# Patient Record
Sex: FEMALE | Race: WHITE | NOT HISPANIC OR LATINO | Employment: FULL TIME | URBAN - NONMETROPOLITAN AREA
[De-identification: names, ages, dates, MRNs, and addresses within clinical notes are randomized per-mention and may not be internally consistent; named-entity substitution may affect disease eponyms.]

---

## 2023-05-02 ENCOUNTER — OFFICE VISIT (OUTPATIENT)
Dept: PRIMARY CARE | Facility: CLINIC | Age: 48
End: 2023-05-02
Payer: COMMERCIAL

## 2023-05-02 VITALS
WEIGHT: 125.7 LBS | HEIGHT: 65 IN | SYSTOLIC BLOOD PRESSURE: 124 MMHG | BODY MASS INDEX: 20.94 KG/M2 | HEART RATE: 98 BPM | DIASTOLIC BLOOD PRESSURE: 70 MMHG

## 2023-05-02 DIAGNOSIS — R10.11 RIGHT UPPER QUADRANT ABDOMINAL PAIN: Primary | ICD-10-CM

## 2023-05-02 DIAGNOSIS — R11.0 CHRONIC NAUSEA: ICD-10-CM

## 2023-05-02 DIAGNOSIS — Z09 HOSPITAL DISCHARGE FOLLOW-UP: ICD-10-CM

## 2023-05-02 PROCEDURE — 99214 OFFICE O/P EST MOD 30 MIN: CPT | Performed by: NURSE PRACTITIONER

## 2023-05-02 RX ORDER — ESTRADIOL AND NORETHINDRONE ACETATE 1; .5 MG/1; MG/1
1 TABLET ORAL
COMMUNITY
Start: 2020-12-04

## 2023-05-02 RX ORDER — UMECLIDINIUM BROMIDE AND VILANTEROL TRIFENATATE 62.5; 25 UG/1; UG/1
1 POWDER RESPIRATORY (INHALATION)
COMMUNITY
Start: 2020-12-11 | End: 2023-07-07 | Stop reason: SDUPTHER

## 2023-05-02 RX ORDER — DICYCLOMINE HYDROCHLORIDE 20 MG/1
20 TABLET ORAL
COMMUNITY
Start: 2022-05-06

## 2023-05-02 RX ORDER — PANTOPRAZOLE SODIUM 40 MG/1
40 TABLET, DELAYED RELEASE ORAL DAILY
COMMUNITY
End: 2023-07-07 | Stop reason: SDUPTHER

## 2023-05-02 RX ORDER — BUPROPION HYDROCHLORIDE 150 MG/1
150 TABLET, EXTENDED RELEASE ORAL
COMMUNITY
Start: 2020-12-11 | End: 2023-07-07 | Stop reason: SDUPTHER

## 2023-05-02 RX ORDER — ALBUTEROL SULFATE 90 UG/1
AEROSOL, METERED RESPIRATORY (INHALATION)
COMMUNITY
End: 2023-07-07 | Stop reason: SDUPTHER

## 2023-05-02 RX ORDER — LEVOTHYROXINE SODIUM 25 UG/1
25 TABLET ORAL
COMMUNITY
Start: 2020-12-11 | End: 2023-07-07 | Stop reason: SDUPTHER

## 2023-05-02 ASSESSMENT — ENCOUNTER SYMPTOMS
FEVER: 0
BLOOD IN STOOL: 0
DIARRHEA: 0
MUSCULOSKELETAL NEGATIVE: 1
LIGHT-HEADEDNESS: 0
DIZZINESS: 0
FATIGUE: 0
HEADACHES: 0
DIFFICULTY URINATING: 0
ABDOMINAL PAIN: 1
ACTIVITY CHANGE: 0
CONSTIPATION: 0
NAUSEA: 1
APPETITE CHANGE: 1
RECTAL PAIN: 0
HEMATURIA: 0
VOMITING: 1

## 2023-05-02 NOTE — PROGRESS NOTES
"Subjective   Patient ID: Mari Sharma is a 48 y.o. female who presents for ER Follow-up (Was in on 4/27 and was released on 4/28).    Hospital discharge follow up:  States prior to going to ER she was having abd pain and vomiting, she then went to the ER at [facility],   She reports vomiting several times in 24 hour prior to ER  Denies diarrhea  States abd pain occurred RUQ, with vomiting (this is always the case)  Hospital admitted for SADIA and dehydration, renal function has returned to normal  Patient reports ABD pain and vomiting episodes every 2-3 months.  Is now tracking the episodes detailed food intake  CT A/P did show subcentimeter non-obstructing renal stones  Patient states she continues to have some nausea and ABD pain, mild compared to pre hospital   She has colonoscopy recently which was negative  No prior EGD         Review of Systems   Constitutional:  Positive for appetite change. Negative for activity change, fatigue and fever.   Gastrointestinal:  Positive for abdominal pain, nausea and vomiting. Negative for blood in stool, constipation, diarrhea and rectal pain.   Genitourinary:  Negative for difficulty urinating, hematuria and pelvic pain.   Musculoskeletal: Negative.    Skin: Negative.    Neurological:  Negative for dizziness, light-headedness and headaches.       Objective   /70 (Patient Position: Sitting)   Pulse 98   Ht 1.651 m (5' 5\")   Wt 57 kg (125 lb 11.2 oz)   BMI 20.92 kg/m²     Physical Exam  Constitutional:       General: She is not in acute distress.     Appearance: Normal appearance. She is normal weight. She is ill-appearing (chronically ill appearing).   Cardiovascular:      Rate and Rhythm: Normal rate and regular rhythm.      Pulses: Normal pulses.   Pulmonary:      Effort: Pulmonary effort is normal.      Breath sounds: Normal breath sounds.   Abdominal:      General: Abdomen is flat. Bowel sounds are normal. There is no distension.      Palpations: There is no mass. "      Tenderness: There is no abdominal tenderness.   Skin:     General: Skin is warm and dry.      Coloration: Skin is pale.   Neurological:      Mental Status: She is alert and oriented to person, place, and time.         Assessment/Plan   Diagnoses and all orders for this visit:  Right upper quadrant abdominal pain  -     Referral to Gastroenterology; Future  -     Advised to continue keeping abdominal pain journal  -    Recommend she follow a bland diet, avoid any spicy foods or red sauces.   Chronic nausea  -     Referral to Gastroenterology; Future  -     Continue to take Zofran previously prescribed as needed, may also try OTC Counter ginger to help with nausea.   Hospital discharge follow-up

## 2023-06-23 ENCOUNTER — HOSPITAL ENCOUNTER (OUTPATIENT)
Dept: DATA CONVERSION | Facility: HOSPITAL | Age: 48
End: 2023-06-23
Attending: INTERNAL MEDICINE | Admitting: INTERNAL MEDICINE
Payer: COMMERCIAL

## 2023-06-23 DIAGNOSIS — Z72.0 TOBACCO USE: ICD-10-CM

## 2023-06-23 DIAGNOSIS — R12 HEARTBURN: ICD-10-CM

## 2023-06-23 DIAGNOSIS — K30 FUNCTIONAL DYSPEPSIA: ICD-10-CM

## 2023-06-23 DIAGNOSIS — E07.9 DISORDER OF THYROID, UNSPECIFIED: ICD-10-CM

## 2023-06-23 DIAGNOSIS — J44.9 CHRONIC OBSTRUCTIVE PULMONARY DISEASE, UNSPECIFIED (MULTI): ICD-10-CM

## 2023-06-23 DIAGNOSIS — E78.00 PURE HYPERCHOLESTEROLEMIA, UNSPECIFIED: ICD-10-CM

## 2023-06-23 DIAGNOSIS — K20.90 ESOPHAGITIS, UNSPECIFIED WITHOUT BLEEDING: ICD-10-CM

## 2023-06-23 DIAGNOSIS — R10.11 RIGHT UPPER QUADRANT PAIN: ICD-10-CM

## 2023-06-23 DIAGNOSIS — R11.0 NAUSEA: ICD-10-CM

## 2023-06-29 LAB
COMPLETE PATHOLOGY REPORT: NORMAL
CONVERTED CLINICAL DIAGNOSIS-HISTORY: NORMAL
CONVERTED FINAL DIAGNOSIS: NORMAL
CONVERTED FINAL REPORT PDF LINK TO COPY AND PASTE: NORMAL
CONVERTED GROSS DESCRIPTION: NORMAL

## 2023-07-07 ENCOUNTER — LAB (OUTPATIENT)
Dept: LAB | Facility: LAB | Age: 48
End: 2023-07-07
Payer: COMMERCIAL

## 2023-07-07 ENCOUNTER — OFFICE VISIT (OUTPATIENT)
Dept: PRIMARY CARE | Facility: CLINIC | Age: 48
End: 2023-07-07
Payer: COMMERCIAL

## 2023-07-07 VITALS
HEART RATE: 94 BPM | DIASTOLIC BLOOD PRESSURE: 70 MMHG | HEIGHT: 65 IN | WEIGHT: 119.2 LBS | SYSTOLIC BLOOD PRESSURE: 118 MMHG | BODY MASS INDEX: 19.86 KG/M2

## 2023-07-07 DIAGNOSIS — J44.9 COPD, MODERATE (MULTI): ICD-10-CM

## 2023-07-07 DIAGNOSIS — R80.9 PROTEINURIA, UNSPECIFIED TYPE: ICD-10-CM

## 2023-07-07 DIAGNOSIS — K29.20 CHRONIC ALCOHOLIC GASTRITIS WITHOUT HEMORRHAGE: Primary | ICD-10-CM

## 2023-07-07 DIAGNOSIS — E87.6 HYPOKALEMIA: ICD-10-CM

## 2023-07-07 DIAGNOSIS — E03.9 ACQUIRED HYPOTHYROIDISM: ICD-10-CM

## 2023-07-07 DIAGNOSIS — F32.89 OTHER DEPRESSION: ICD-10-CM

## 2023-07-07 DIAGNOSIS — J20.9 ACUTE BRONCHITIS, UNSPECIFIED ORGANISM: ICD-10-CM

## 2023-07-07 DIAGNOSIS — K21.9 GASTROESOPHAGEAL REFLUX DISEASE WITHOUT ESOPHAGITIS: ICD-10-CM

## 2023-07-07 PROBLEM — R61 NIGHT SWEATS: Status: ACTIVE | Noted: 2023-07-07

## 2023-07-07 PROBLEM — F32.A DEPRESSION: Status: ACTIVE | Noted: 2023-07-07

## 2023-07-07 LAB
ALANINE AMINOTRANSFERASE (SGPT) (U/L) IN SER/PLAS: 12 U/L (ref 7–45)
ALBUMIN (G/DL) IN SER/PLAS: 4.4 G/DL (ref 3.4–5)
ALKALINE PHOSPHATASE (U/L) IN SER/PLAS: 46 U/L (ref 33–110)
ANION GAP IN SER/PLAS: 11 MMOL/L (ref 10–20)
APPEARANCE, URINE: NORMAL
ASPARTATE AMINOTRANSFERASE (SGOT) (U/L) IN SER/PLAS: 15 U/L (ref 9–39)
BILIRUBIN TOTAL (MG/DL) IN SER/PLAS: 0.5 MG/DL (ref 0–1.2)
BILIRUBIN, URINE: NEGATIVE
BLOOD, URINE: NEGATIVE
CALCIUM (MG/DL) IN SER/PLAS: 9.5 MG/DL (ref 8.6–10.3)
CARBON DIOXIDE, TOTAL (MMOL/L) IN SER/PLAS: 29 MMOL/L (ref 21–32)
CHLORIDE (MMOL/L) IN SER/PLAS: 104 MMOL/L (ref 98–107)
COLOR, URINE: YELLOW
CREATININE (MG/DL) IN SER/PLAS: 0.78 MG/DL (ref 0.5–1.05)
GFR FEMALE: >90 ML/MIN/1.73M2
GLUCOSE (MG/DL) IN SER/PLAS: 82 MG/DL (ref 74–99)
GLUCOSE, URINE: NEGATIVE MG/DL
KETONES, URINE: NEGATIVE MG/DL
LEUKOCYTE ESTERASE, URINE: NEGATIVE
NITRITE, URINE: NEGATIVE
PH, URINE: 7 (ref 5–8)
POTASSIUM (MMOL/L) IN SER/PLAS: 3.6 MMOL/L (ref 3.5–5.3)
PROTEIN TOTAL: 6.5 G/DL (ref 6.4–8.2)
PROTEIN, URINE: NEGATIVE MG/DL
SODIUM (MMOL/L) IN SER/PLAS: 140 MMOL/L (ref 136–145)
SPECIFIC GRAVITY, URINE: 1.01 (ref 1–1.03)
UREA NITROGEN (MG/DL) IN SER/PLAS: 8 MG/DL (ref 6–23)
UROBILINOGEN, URINE: <2 MG/DL (ref 0–1.9)

## 2023-07-07 PROCEDURE — 99214 OFFICE O/P EST MOD 30 MIN: CPT | Performed by: NURSE PRACTITIONER

## 2023-07-07 PROCEDURE — 81003 URINALYSIS AUTO W/O SCOPE: CPT

## 2023-07-07 PROCEDURE — 36415 COLL VENOUS BLD VENIPUNCTURE: CPT

## 2023-07-07 PROCEDURE — 80053 COMPREHEN METABOLIC PANEL: CPT

## 2023-07-07 RX ORDER — METHYLPREDNISOLONE 4 MG/1
TABLET ORAL
Qty: 21 TABLET | Refills: 0 | Status: SHIPPED | OUTPATIENT
Start: 2023-07-07 | End: 2023-07-14

## 2023-07-07 RX ORDER — BUPROPION HYDROCHLORIDE 150 MG/1
150 TABLET, EXTENDED RELEASE ORAL
Qty: 30 TABLET | Refills: 11 | Status: SHIPPED | OUTPATIENT
Start: 2023-07-07 | End: 2024-07-06

## 2023-07-07 RX ORDER — SUCRALFATE 1 G/1
1 TABLET ORAL
COMMUNITY
Start: 2023-07-04 | End: 2023-08-03 | Stop reason: SDUPTHER

## 2023-07-07 RX ORDER — UMECLIDINIUM BROMIDE AND VILANTEROL TRIFENATATE 62.5; 25 UG/1; UG/1
1 POWDER RESPIRATORY (INHALATION)
Qty: 30 EACH | Refills: 11 | Status: SHIPPED | OUTPATIENT
Start: 2023-07-07 | End: 2024-07-06

## 2023-07-07 RX ORDER — ALBUTEROL SULFATE 90 UG/1
2 AEROSOL, METERED RESPIRATORY (INHALATION) EVERY 6 HOURS PRN
Qty: 18 G | Refills: 2 | Status: SHIPPED | OUTPATIENT
Start: 2023-07-07 | End: 2024-02-12

## 2023-07-07 RX ORDER — PANTOPRAZOLE SODIUM 40 MG/1
40 TABLET, DELAYED RELEASE ORAL DAILY
Qty: 30 TABLET | Refills: 11 | Status: SHIPPED | OUTPATIENT
Start: 2023-07-07 | End: 2023-10-16 | Stop reason: SDUPTHER

## 2023-07-07 RX ORDER — AZITHROMYCIN 250 MG/1
TABLET, FILM COATED ORAL
Qty: 6 TABLET | Refills: 0 | Status: SHIPPED | OUTPATIENT
Start: 2023-07-07 | End: 2023-07-12

## 2023-07-07 RX ORDER — LEVOTHYROXINE SODIUM 25 UG/1
25 TABLET ORAL
Qty: 30 TABLET | Refills: 11 | Status: SHIPPED | OUTPATIENT
Start: 2023-07-07 | End: 2024-07-06

## 2023-07-07 NOTE — PROGRESS NOTES
"Subjective   Patient ID: Mari Sharma is a 48 y.o. female who presents for Hypothyroidism (6 month).    Daily alcohol consumption 3-4 drinks (beer), daily   Daily smoker  Recent ER visit CT A/P showing recurrent gastritis  CT results as follow:  1. There is persistent abnormal thickened appearance of the gastric rugal fold   pattern in part related to partial distention as well as significant gastritis   again noted.     2. Multiple retained gallstones abutting the hepatic capsular surfaces. Most   are located within Morison pouch. Prior cholecystectomy.     3. Stable mild intrahepatic and moderate extrahepatic biliary ductal dilatation   as well as mild ectasia of the main pancreatic duct. Negative for acute   pancreatitis.     4. Subcentimeter nonobstructive renal calculi again noted without   hydronephrosis.     5. Bowel pattern is nonobstructive. Prior appendectomy. Mild sigmoid   diverticulosis without diverticulitis.     6. Enlarged leiomyomatous uterus again noted.     Electrolytes were abnormal including low potassium 3.3  UA was positive for protein and ketones  Patient continues to have intermittent symptoms of gastritis, reports compliance with taking sucralfate and Protonix  ABD pain \"comes/goes\"  Has not been able to return to work since ER visit, states she is now having cough and upper respiratory sx, continues to be a daily smoker  Reports compliance with inhalers           Review of Systems   Constitutional:  Negative for appetite change and fever.   HENT:  Positive for congestion.    Respiratory:  Positive for cough and wheezing. Negative for chest tightness and shortness of breath.    Cardiovascular:  Negative for chest pain, palpitations and leg swelling.   Gastrointestinal:  Positive for abdominal pain, nausea and vomiting. Negative for diarrhea.   Endocrine: Negative for cold intolerance and heat intolerance.   Musculoskeletal:  Negative for arthralgias and myalgias.   Skin: Negative.  " "  Neurological:  Negative for dizziness, weakness, light-headedness and headaches.       Objective   /70 (Patient Position: Sitting)   Pulse 94   Ht 1.651 m (5' 5\")   Wt 54.1 kg (119 lb 3.2 oz)   BMI 19.84 kg/m²     Physical Exam  Constitutional:       General: She is not in acute distress.     Appearance: Normal appearance.   Cardiovascular:      Rate and Rhythm: Normal rate and regular rhythm.      Pulses: Normal pulses.   Pulmonary:      Breath sounds: Wheezing present.      Comments: Coarse lung sounds posteriorly   Abdominal:      General: Abdomen is flat. Bowel sounds are normal.      Palpations: Abdomen is soft.      Tenderness: There is no abdominal tenderness.   Musculoskeletal:      Right lower leg: No edema.      Left lower leg: No edema.   Skin:     General: Skin is warm.      Coloration: Skin is pale.   Neurological:      Mental Status: She is alert and oriented to person, place, and time.         Assessment/Plan   Diagnoses and all orders for this visit:  Chronic alcoholic gastritis without hemorrhage   -Continue on sucralfate and Protonix as ordered   -Advised to decrease alcohol consumption  Hypokalemia  -     Comprehensive Metabolic Panel; Future  Proteinuria, unspecified type  -     Comprehensive Metabolic Panel; Future  -     Urinalysis with Reflex Microscopic; Future  Acute bronchitis, unspecified organism  -     methylPREDNISolone (Medrol Dospak) 4 mg tablets; Take as directed on package.  -     azithromycin (Zithromax) 250 mg tablet; Take 2 tabs (500 mg) by mouth today, then 1 daily for 4 more days.  -     albuterol 90 mcg/actuation inhaler; Inhale 2 puffs every 6 hours if needed for wheezing.  Acquired hypothyroidism  -     levothyroxine (Synthroid, Levoxyl) 25 mcg tablet; Take 1 tablet (25 mcg) by mouth once daily. Refill sent  COPD, moderate (CMS/HCC)  -     albuterol 90 mcg/actuation inhaler; Inhale 2 puffs every 6 hours if needed for wheezing.  -     Anoro Ellipta 62.5-25 " mcg/actuation blister with device; Inhale 1 puff once daily.  Gastroesophageal reflux disease without esophagitis  -     pantoprazole (ProtoNix) 40 mg EC tablet; Take 1 tablet (40 mg) by mouth once daily.  Other depression  -     buPROPion SR (Wellbutrin SR) 150 mg 12 hr tablet; Take 1 tablet (150 mg) by mouth once daily.  Other orders  -     Follow Up In Primary Care - Health Maintenance; Future

## 2023-07-07 NOTE — LETTER
July 7, 2023     Patient: Mari Sharma   YOB: 1975   Date of Visit: 7/7/2023       To Whom It May Concern:    Mari Sharma was seen in my clinic on 7/7/2023 at 2:00 pm. Please excuse Mari for her absence from work 07/06/2023 through 07/09/2023    If you have any questions or concerns, please don't hesitate to call.         Sincerely,         Ivring Gaming, APRN-CNP        CC: No Recipients

## 2023-07-09 ASSESSMENT — ENCOUNTER SYMPTOMS
VOMITING: 1
ABDOMINAL PAIN: 1
WHEEZING: 1
WEAKNESS: 0
DIARRHEA: 0
LIGHT-HEADEDNESS: 0
COUGH: 1
ARTHRALGIAS: 0
HEADACHES: 0
NAUSEA: 1
CHEST TIGHTNESS: 0
PALPITATIONS: 0
FEVER: 0
APPETITE CHANGE: 0
DIZZINESS: 0
SHORTNESS OF BREATH: 0
MYALGIAS: 0

## 2023-08-03 ENCOUNTER — TELEPHONE (OUTPATIENT)
Dept: PRIMARY CARE | Facility: CLINIC | Age: 48
End: 2023-08-03
Payer: COMMERCIAL

## 2023-08-03 DIAGNOSIS — K21.9 GASTROESOPHAGEAL REFLUX DISEASE WITHOUT ESOPHAGITIS: ICD-10-CM

## 2023-08-03 RX ORDER — SUCRALFATE 1 G/1
1 TABLET ORAL
Qty: 60 TABLET | Refills: 0 | Status: SHIPPED | OUTPATIENT
Start: 2023-08-03 | End: 2023-08-09 | Stop reason: SDUPTHER

## 2023-08-09 ENCOUNTER — OFFICE VISIT (OUTPATIENT)
Dept: PRIMARY CARE | Facility: CLINIC | Age: 48
End: 2023-08-09
Payer: COMMERCIAL

## 2023-08-09 VITALS
BODY MASS INDEX: 20.04 KG/M2 | DIASTOLIC BLOOD PRESSURE: 80 MMHG | WEIGHT: 120.3 LBS | SYSTOLIC BLOOD PRESSURE: 122 MMHG | HEIGHT: 65 IN | HEART RATE: 104 BPM

## 2023-08-09 DIAGNOSIS — J44.9 COPD, MODERATE (MULTI): Primary | ICD-10-CM

## 2023-08-09 DIAGNOSIS — K29.20 CHRONIC ALCOHOLIC GASTRITIS WITHOUT HEMORRHAGE: ICD-10-CM

## 2023-08-09 DIAGNOSIS — K21.9 GASTROESOPHAGEAL REFLUX DISEASE WITHOUT ESOPHAGITIS: ICD-10-CM

## 2023-08-09 DIAGNOSIS — F10.288 ALCOHOL DEPENDENCE WITH OTHER ALCOHOL-INDUCED DISORDER (MULTI): ICD-10-CM

## 2023-08-09 PROBLEM — F10.20 ALCOHOL DEPENDENCE (MULTI): Status: ACTIVE | Noted: 2023-08-09

## 2023-08-09 PROCEDURE — 99213 OFFICE O/P EST LOW 20 MIN: CPT | Performed by: NURSE PRACTITIONER

## 2023-08-09 RX ORDER — SUCRALFATE 1 G/1
1 TABLET ORAL
Qty: 60 TABLET | Refills: 0 | Status: SHIPPED | OUTPATIENT
Start: 2023-09-03 | End: 2023-10-03

## 2023-08-09 ASSESSMENT — ENCOUNTER SYMPTOMS
FATIGUE: 0
LIGHT-HEADEDNESS: 0
ABDOMINAL PAIN: 0
ARTHRALGIAS: 0
DIARRHEA: 0
WHEEZING: 0
PALPITATIONS: 0
VOMITING: 0
ACTIVITY CHANGE: 0
APPETITE CHANGE: 0
HEADACHES: 0
NAUSEA: 0
DIZZINESS: 0
FEVER: 0
MYALGIAS: 0
COUGH: 1
SHORTNESS OF BREATH: 0

## 2023-08-09 NOTE — PROGRESS NOTES
"Subjective   Patient ID: Mari Sharma is a 48 y.o. female who presents for Gastritis (1 month).    ABD pain has decreased,   Denies N/V/D  Needs refill on Carafate,   Scheduled to see Vivienne in 08/17/23, may need reschedule due to vacation  Alcohol consumption was 5-6 daily has decreased to 4 drinks daily          Review of Systems   Constitutional:  Negative for activity change, appetite change, fatigue and fever.   Respiratory:  Positive for cough. Negative for shortness of breath and wheezing.    Cardiovascular:  Negative for chest pain, palpitations and leg swelling.   Gastrointestinal:  Negative for abdominal pain, diarrhea, nausea and vomiting.   Musculoskeletal:  Negative for arthralgias and myalgias.   Neurological:  Negative for dizziness, light-headedness and headaches.       Objective   /80 (Patient Position: Sitting)   Pulse 104   Ht 1.651 m (5' 5\")   Wt 54.6 kg (120 lb 4.8 oz)   BMI 20.02 kg/m²     Physical Exam  Vitals reviewed.   Constitutional:       General: She is not in acute distress.     Appearance: Normal appearance. She is normal weight.   Cardiovascular:      Rate and Rhythm: Normal rate and regular rhythm.      Pulses: Normal pulses.   Pulmonary:      Effort: Pulmonary effort is normal.      Breath sounds: Normal breath sounds.   Abdominal:      General: Abdomen is flat.      Palpations: Abdomen is soft.      Tenderness: There is no abdominal tenderness.   Skin:     General: Skin is warm and dry.   Neurological:      Mental Status: She is alert and oriented to person, place, and time.         Assessment/Plan   Diagnoses and all orders for this visit:  COPD, moderate (CMS/HCC)   -Patient not interested in quitting smoking   -Continue on Anoro Elipta and albuterol as needed   -Advised to take OTC Mucinex daily for respiratory congestion/cough an increase water consumption   Gastroesophageal reflux disease without esophagitis  -     sucralfate (Carafate) 1 gram tablet; Take 1 " tablet (1 g) by mouth 2 times a day before meals. Do not start before September 3, 2023.  Alcohol dependence with other alcohol-induced disorder (CMS/HCC)   -Advised to decrease alcohol consumption  Had a wyatt conversation regarding alcohol use its correlation to gastritis.   Other orders  -     Follow Up In Primary Care - Health Maintenance  -     Follow Up In Primary Care - Health Maintenance; Future

## 2023-08-14 ENCOUNTER — APPOINTMENT (OUTPATIENT)
Dept: PRIMARY CARE | Facility: CLINIC | Age: 48
End: 2023-08-14
Payer: COMMERCIAL

## 2023-09-07 VITALS — HEIGHT: 65 IN | WEIGHT: 120.15 LBS | BODY MASS INDEX: 20.02 KG/M2

## 2023-09-13 PROBLEM — R82.90 ABNORMAL URINALYSIS: Status: ACTIVE | Noted: 2023-09-13

## 2023-09-13 PROBLEM — J47.9 BRONCHIECTASIS (MULTI): Status: ACTIVE | Noted: 2023-09-13

## 2023-09-13 PROBLEM — R06.83 SNORING: Status: ACTIVE | Noted: 2023-09-13

## 2023-09-13 PROBLEM — R79.89 ABNORMAL CBC: Status: ACTIVE | Noted: 2023-09-13

## 2023-09-13 PROBLEM — R00.2 PALPITATIONS: Status: ACTIVE | Noted: 2023-09-13

## 2023-09-13 PROBLEM — R10.9 ABDOMINAL CRAMPING: Status: ACTIVE | Noted: 2023-09-13

## 2023-09-13 PROBLEM — K92.1 BLOOD IN STOOL: Status: ACTIVE | Noted: 2023-09-13

## 2023-09-13 PROBLEM — R10.9 ABDOMINAL PAIN: Status: ACTIVE | Noted: 2023-09-13

## 2023-09-13 PROBLEM — M77.8 TENDONITIS OF WRIST, LEFT: Status: ACTIVE | Noted: 2023-09-13

## 2023-09-13 PROBLEM — K29.60 GASTRITIS, BILE ACID REFLUX: Status: ACTIVE | Noted: 2023-09-13

## 2023-09-13 RX ORDER — NAPROXEN 500 MG/1
500 TABLET ORAL EVERY 12 HOURS PRN
COMMUNITY

## 2023-10-16 ENCOUNTER — OFFICE VISIT (OUTPATIENT)
Dept: GASTROENTEROLOGY | Facility: CLINIC | Age: 48
End: 2023-10-16
Payer: COMMERCIAL

## 2023-10-16 VITALS — BODY MASS INDEX: 19.69 KG/M2 | WEIGHT: 118.2 LBS | HEIGHT: 65 IN

## 2023-10-16 DIAGNOSIS — K21.9 GASTROESOPHAGEAL REFLUX DISEASE WITHOUT ESOPHAGITIS: ICD-10-CM

## 2023-10-16 DIAGNOSIS — K29.60 GASTRITIS, BILE ACID REFLUX: Primary | ICD-10-CM

## 2023-10-16 PROCEDURE — 99214 OFFICE O/P EST MOD 30 MIN: CPT | Performed by: INTERNAL MEDICINE

## 2023-10-16 RX ORDER — PANTOPRAZOLE SODIUM 40 MG/1
40 TABLET, DELAYED RELEASE ORAL DAILY
Qty: 90 TABLET | Refills: 3 | Status: SHIPPED | OUTPATIENT
Start: 2023-10-16 | End: 2024-10-15

## 2023-10-16 RX ORDER — PROMETHAZINE HYDROCHLORIDE 25 MG/1
25 SUPPOSITORY RECTAL EVERY 6 HOURS PRN
Qty: 30 SUPPOSITORY | Refills: 0 | Status: SHIPPED | OUTPATIENT
Start: 2023-10-16 | End: 2023-11-15

## 2023-10-16 ASSESSMENT — ENCOUNTER SYMPTOMS
ARTHRALGIAS: 0
UNEXPECTED WEIGHT CHANGE: 0
ROS GI COMMENTS: AS IN HPI
SHORTNESS OF BREATH: 0
FEVER: 0
COUGH: 0
CHILLS: 0
PALPITATIONS: 0

## 2023-10-16 ASSESSMENT — LIFESTYLE VARIABLES
HOW OFTEN DURING THE LAST YEAR HAVE YOU NEEDED AN ALCOHOLIC DRINK FIRST THING IN THE MORNING TO GET YOURSELF GOING AFTER A NIGHT OF HEAVY DRINKING: NEVER
AUDIT-C TOTAL SCORE: 8
HOW OFTEN DURING THE LAST YEAR HAVE YOU FOUND THAT YOU WERE NOT ABLE TO STOP DRINKING ONCE YOU HAD STARTED: NEVER
HOW MANY STANDARD DRINKS CONTAINING ALCOHOL DO YOU HAVE ON A TYPICAL DAY: 3 OR 4
HOW OFTEN DO YOU HAVE SIX OR MORE DRINKS ON ONE OCCASION: WEEKLY
HOW OFTEN DURING THE LAST YEAR HAVE YOU HAD A FEELING OF GUILT OR REMORSE AFTER DRINKING: NEVER
HOW OFTEN DO YOU HAVE A DRINK CONTAINING ALCOHOL: 4 OR MORE TIMES A WEEK
AUDIT TOTAL SCORE: 8
SKIP TO QUESTIONS 9-10: 0
HOW OFTEN DURING THE LAST YEAR HAVE YOU FAILED TO DO WHAT WAS NORMALLY EXPECTED FROM YOU BECAUSE OF DRINKING: NEVER
HAS A RELATIVE, FRIEND, DOCTOR, OR ANOTHER HEALTH PROFESSIONAL EXPRESSED CONCERN ABOUT YOUR DRINKING OR SUGGESTED YOU CUT DOWN: NO
HAVE YOU OR SOMEONE ELSE BEEN INJURED AS A RESULT OF YOUR DRINKING: NO
HOW OFTEN DURING THE LAST YEAR HAVE YOU BEEN UNABLE TO REMEMBER WHAT HAPPENED THE NIGHT BEFORE BECAUSE YOU HAD BEEN DRINKING: NEVER

## 2023-10-16 NOTE — PROGRESS NOTES
Subjective   Patient ID: Mari Sharma is a 48 y.o. female who presents for Follow-up (4-8 week follow up. Hx of EGD and Colonoscopy. Vomiting for a couple days approx 1-2 times per month. ).  HPI he is seen today in follow-up.  Upper endoscopy was recently performed because of recalcitrant heartburn nausea.  Upper endoscopy revealed alkaline reflux gastritis.  She failed therapeutic trial of sucralfate.  She is currently on Protonix 40 mg daily.  Approximately once or twice a month she will wake up in the middle the night or in the morning with severe nausea producing projectile vomiting of bile and cannot make it to work that day.  She requests higher dosage of Zofran as it is an effective or better medication.    Review of Systems   Constitutional:  Negative for chills, fever and unexpected weight change.   Respiratory:  Negative for cough and shortness of breath.    Cardiovascular:  Negative for chest pain, palpitations and leg swelling.   Gastrointestinal:         As in HPI   Musculoskeletal:  Negative for arthralgias and gait problem.   Skin:  Negative for rash.       Objective   Physical Exam  Vitals reviewed.   Constitutional:       General: She is awake.      Appearance: Normal appearance.   HENT:      Head: Normocephalic.      Mouth/Throat:      Mouth: Mucous membranes are moist.      Pharynx: Oropharynx is clear.   Eyes:      Conjunctiva/sclera: Conjunctivae normal.      Pupils: Pupils are equal, round, and reactive to light.   Cardiovascular:      Rate and Rhythm: Normal rate and regular rhythm.      Heart sounds: Normal heart sounds.      Comments: No overt chest pain  Pulmonary:      Effort: Pulmonary effort is normal.      Breath sounds: Normal breath sounds.   Abdominal:      General: Bowel sounds are normal.      Palpations: Abdomen is soft.      Tenderness: There is no abdominal tenderness.   Musculoskeletal:      Cervical back: Normal range of motion and neck supple.   Skin:     General: Skin is  warm and dry.   Neurological:      Mental Status: She is alert and oriented to person, place, and time.   Psychiatric:         Attention and Perception: Attention and perception normal.         Behavior: Behavior normal.         Assessment/Plan   Problem List Items Addressed This Visit             ICD-10-CM    GERD (gastroesophageal reflux disease) K21.9    Relevant Medications    promethazine (Phenergan) 25 mg suppository    pantoprazole (ProtoNix) 40 mg EC tablet    Other Relevant Orders    Follow Up In Gastroenterology    Gastritis, bile acid reflux - Primary K29.60    Relevant Medications    promethazine (Phenergan) 25 mg suppository    Other Relevant Orders    Follow Up In Gastroenterology     I advised we need to change timing of Protonix to 40 mg with large meal and evening discontinue Zofran as it is ineffective use Phenergan for severe nausea follow-up in 6 months

## 2024-02-12 ENCOUNTER — OFFICE VISIT (OUTPATIENT)
Dept: PRIMARY CARE | Facility: CLINIC | Age: 49
End: 2024-02-12
Payer: COMMERCIAL

## 2024-02-12 ENCOUNTER — HOSPITAL ENCOUNTER (OUTPATIENT)
Dept: RADIOLOGY | Facility: CLINIC | Age: 49
Discharge: HOME | End: 2024-02-12
Payer: COMMERCIAL

## 2024-02-12 VITALS
OXYGEN SATURATION: 97 % | WEIGHT: 114 LBS | DIASTOLIC BLOOD PRESSURE: 80 MMHG | SYSTOLIC BLOOD PRESSURE: 130 MMHG | BODY MASS INDEX: 18.99 KG/M2 | HEART RATE: 96 BPM | HEIGHT: 65 IN

## 2024-02-12 DIAGNOSIS — R10.9 ABDOMINAL CRAMPING: ICD-10-CM

## 2024-02-12 DIAGNOSIS — G89.29 CHRONIC RIGHT SHOULDER PAIN: ICD-10-CM

## 2024-02-12 DIAGNOSIS — E03.9 HYPOTHYROIDISM, UNSPECIFIED TYPE: ICD-10-CM

## 2024-02-12 DIAGNOSIS — J44.9 COPD, MODERATE (MULTI): ICD-10-CM

## 2024-02-12 DIAGNOSIS — M54.2 NECK PAIN: Primary | ICD-10-CM

## 2024-02-12 DIAGNOSIS — Z13.220 SCREENING FOR CHOLESTEROL LEVEL: ICD-10-CM

## 2024-02-12 DIAGNOSIS — M54.2 NECK PAIN: ICD-10-CM

## 2024-02-12 DIAGNOSIS — F10.288 ALCOHOL DEPENDENCE WITH OTHER ALCOHOL-INDUCED DISORDER (MULTI): ICD-10-CM

## 2024-02-12 DIAGNOSIS — M25.511 CHRONIC RIGHT SHOULDER PAIN: ICD-10-CM

## 2024-02-12 PROCEDURE — 73030 X-RAY EXAM OF SHOULDER: CPT | Mod: RIGHT SIDE | Performed by: RADIOLOGY

## 2024-02-12 PROCEDURE — 73030 X-RAY EXAM OF SHOULDER: CPT | Mod: RT

## 2024-02-12 PROCEDURE — 72040 X-RAY EXAM NECK SPINE 2-3 VW: CPT | Performed by: RADIOLOGY

## 2024-02-12 PROCEDURE — 72040 X-RAY EXAM NECK SPINE 2-3 VW: CPT

## 2024-02-12 PROCEDURE — 99214 OFFICE O/P EST MOD 30 MIN: CPT

## 2024-02-12 RX ORDER — SUCRALFATE 1 G/1
1 TABLET ORAL DAILY
COMMUNITY
End: 2024-02-12 | Stop reason: SDUPTHER

## 2024-02-12 RX ORDER — DICLOFENAC SODIUM 10 MG/G
4 GEL TOPICAL 4 TIMES DAILY
Qty: 100 G | Refills: 1 | Status: SHIPPED | OUTPATIENT
Start: 2024-02-12

## 2024-02-12 RX ORDER — CYCLOBENZAPRINE HCL 10 MG
10 TABLET ORAL NIGHTLY PRN
Qty: 30 TABLET | Refills: 0 | Status: SHIPPED | OUTPATIENT
Start: 2024-02-12 | End: 2024-04-12

## 2024-02-12 RX ORDER — SUCRALFATE 1 G/1
1 TABLET ORAL DAILY
Qty: 30 TABLET | Refills: 2 | Status: SHIPPED | OUTPATIENT
Start: 2024-02-12 | End: 2024-04-18 | Stop reason: SDUPTHER

## 2024-02-12 ASSESSMENT — ENCOUNTER SYMPTOMS
GASTROINTESTINAL NEGATIVE: 1
CONSTITUTIONAL NEGATIVE: 1
RESPIRATORY NEGATIVE: 1
CARDIOVASCULAR NEGATIVE: 1

## 2024-02-12 ASSESSMENT — PATIENT HEALTH QUESTIONNAIRE - PHQ9
SUM OF ALL RESPONSES TO PHQ9 QUESTIONS 1 AND 2: 0
1. LITTLE INTEREST OR PLEASURE IN DOING THINGS: NOT AT ALL
2. FEELING DOWN, DEPRESSED OR HOPELESS: NOT AT ALL

## 2024-02-12 NOTE — PROGRESS NOTES
"Subjective   Patient ID: Mari Sharma is a 49 y.o. female who presents for 6 month med check  (Pt c/o right side shoulder pain, right hand numbness).    HPI   GERD: Issues since gallbladder was removed, has struggled with abdominal pain and vomiting after eating for years now, following with Dr. Jack, appt soon, Protonix and sucralfate current regimen.  HYPOTHYROID: Compliant with levo, no SE. Last TSH WNL.  ANXIETY: Compliant with Wellbutrin, stable. KIMBER 0 today.  COPD: Stable on Anoro, needing rescue about 5-10 times yearly.   ALCOHOL DEPENDENCE: 4-5 drinks daily.  SMOKER: 38 pack years.   SHOULDER/NECK PAIN: R shoulder/neck pain chronically. Worse in colder weather. Work is physical. Pain is intermittent, cannot correlate with movement, seems to be posture related. Has not been to chiropractor. R arm has gone \"numb\" while lying down as well.     [facility] in [place]. They take care of mammos, good 2023.  Colonoscopy 2022, due again 2027.      Review of Systems   Constitutional: Negative.    Respiratory: Negative.     Cardiovascular: Negative.    Gastrointestinal: Negative.        Objective   /80   Pulse 96   Ht 1.651 m (5' 5\")   Wt 51.7 kg (114 lb)   SpO2 97%   BMI 18.97 kg/m²     Physical Exam  Constitutional:       General: She is not in acute distress.     Appearance: Normal appearance. She is not ill-appearing.   HENT:      Head: Normocephalic and atraumatic.   Eyes:      Extraocular Movements: Extraocular movements intact.      Conjunctiva/sclera: Conjunctivae normal.   Cardiovascular:      Rate and Rhythm: Normal rate.   Pulmonary:      Effort: Pulmonary effort is normal.   Abdominal:      General: There is no distension.   Musculoskeletal:         General: Tenderness present. Normal range of motion.      Cervical back: Normal range of motion.   Skin:     General: Skin is warm and dry.   Neurological:      General: No focal deficit present.      Mental Status: She is alert and oriented " to person, place, and time.   Psychiatric:         Mood and Affect: Mood normal.         Behavior: Behavior normal.         Thought Content: Thought content normal.         Judgment: Judgment normal.         Assessment/Plan        Chronic conditions stable.  Xray R shoulder and neck today, Rx Flexeril/Volteran gel, advised chiropractor/massage therapy, let me know if symptoms not resolving.  No other medication changes today.  Fasting labs soon.  Follow up 6 months or sooner prn.

## 2024-04-18 ENCOUNTER — OFFICE VISIT (OUTPATIENT)
Dept: GASTROENTEROLOGY | Facility: CLINIC | Age: 49
End: 2024-04-18
Payer: COMMERCIAL

## 2024-04-18 VITALS — BODY MASS INDEX: 18.97 KG/M2 | WEIGHT: 114 LBS

## 2024-04-18 DIAGNOSIS — R10.9 ABDOMINAL CRAMPING: ICD-10-CM

## 2024-04-18 DIAGNOSIS — K29.60 ALKALINE REFLUX GASTRITIS: Primary | ICD-10-CM

## 2024-04-18 DIAGNOSIS — K21.9 GASTROESOPHAGEAL REFLUX DISEASE WITHOUT ESOPHAGITIS: ICD-10-CM

## 2024-04-18 DIAGNOSIS — K29.60 GASTRITIS, BILE ACID REFLUX: ICD-10-CM

## 2024-04-18 PROCEDURE — 99213 OFFICE O/P EST LOW 20 MIN: CPT | Performed by: INTERNAL MEDICINE

## 2024-04-18 RX ORDER — SUCRALFATE 1 G/1
1 TABLET ORAL
Qty: 180 TABLET | Refills: 3 | Status: SHIPPED | OUTPATIENT
Start: 2024-04-18 | End: 2025-04-18

## 2024-04-18 ASSESSMENT — ENCOUNTER SYMPTOMS
ABDOMINAL PAIN: 1
ENDOCRINE NEGATIVE: 1
RESPIRATORY NEGATIVE: 1
CONSTITUTIONAL NEGATIVE: 1
NEUROLOGICAL NEGATIVE: 1
HEMATOLOGIC/LYMPHATIC NEGATIVE: 1
CARDIOVASCULAR NEGATIVE: 1
EYES NEGATIVE: 1
NAUSEA: 1

## 2024-04-18 NOTE — PROGRESS NOTES
Subjective   Patient ID: Mari Sharma is a 49 y.o. female who presents for Nausea (2 instances in December, has to go to ER every time. Pt reports it has been better recently.  ).  HPI patient is seen today acutely.  Is having increasing dyspepsia underwent EGD in June 2023 with findings of gastritis, presumed bile reflux gastritis biopsy at that time showed no evidence of H. pylori.  She improved with Carafate and was weaned off.  Increase stress in diet if caused worsening abdominal pain.  She is requesting refill of Carafate.  She denies any melena no hematemesis denies any weight loss unfortunately continues smoke 1 pack cigarettes daily    Review of Systems   Constitutional: Negative.    HENT: Negative.     Eyes: Negative.    Respiratory: Negative.     Cardiovascular: Negative.    Gastrointestinal:  Positive for abdominal pain and nausea.   Endocrine: Negative.    Genitourinary: Negative.    Neurological: Negative.    Hematological: Negative.        Objective   Physical Exam  Vitals and nursing note reviewed.   Constitutional:       Appearance: Normal appearance.   HENT:      Head: Normocephalic.      Mouth/Throat:      Mouth: Mucous membranes are moist.      Pharynx: Oropharynx is clear.   Eyes:      Conjunctiva/sclera: Conjunctivae normal.      Pupils: Pupils are equal, round, and reactive to light.   Cardiovascular:      Rate and Rhythm: Normal rate and regular rhythm.      Pulses: Normal pulses.      Heart sounds: Normal heart sounds.   Pulmonary:      Effort: Pulmonary effort is normal.      Breath sounds: Normal breath sounds.   Abdominal:      General: Abdomen is flat. Bowel sounds are normal.      Palpations: Abdomen is soft.   Musculoskeletal:      Cervical back: Normal range of motion and neck supple.   Skin:     General: Skin is warm and dry.   Neurological:      General: No focal deficit present.      Mental Status: She is alert and oriented to person, place, and time.   Psychiatric:          Behavior: Behavior normal.         Assessment/Plan   Diagnoses and all orders for this visit:  Alkaline reflux gastritis  Gastritis, bile acid reflux  -     Follow Up In Gastroenterology  Gastroesophageal reflux disease without esophagitis  -     Follow Up In Gastroenterology  Abdominal cramping  -     sucralfate (Carafate) 1 gram tablet; Take 1 tablet (1 g) by mouth 2 times a day before meals.       Resume Carafate advance to twice daily use as needed once symptoms improved.  Follow-up in 6 months    Trey Jack DO 04/18/24 1:46 PM

## 2024-07-02 ENCOUNTER — TELEPHONE (OUTPATIENT)
Dept: PRIMARY CARE | Facility: CLINIC | Age: 49
End: 2024-07-02
Payer: COMMERCIAL

## 2024-07-02 DIAGNOSIS — F32.89 OTHER DEPRESSION: ICD-10-CM

## 2024-07-02 DIAGNOSIS — J44.9 COPD, MODERATE (MULTI): ICD-10-CM

## 2024-07-02 RX ORDER — UMECLIDINIUM BROMIDE AND VILANTEROL TRIFENATATE 62.5; 25 UG/1; UG/1
1 POWDER RESPIRATORY (INHALATION)
Qty: 30 EACH | Refills: 11 | Status: SHIPPED | OUTPATIENT
Start: 2024-07-02 | End: 2025-07-02

## 2024-07-02 RX ORDER — BUPROPION HYDROCHLORIDE 150 MG/1
150 TABLET, EXTENDED RELEASE ORAL
Qty: 30 TABLET | Refills: 11 | Status: SHIPPED | OUTPATIENT
Start: 2024-07-02 | End: 2025-07-02

## 2024-07-02 NOTE — TELEPHONE ENCOUNTER
Patient called in and would like a refill of the following medication......Anoro Ellipta 62.5-25 mcg/actuation blister with device , and buPROPion SR (Wellbutrin SR) 150 mg 12 hr tablet called into [facility]

## 2024-08-12 ENCOUNTER — APPOINTMENT (OUTPATIENT)
Dept: PRIMARY CARE | Facility: CLINIC | Age: 49
End: 2024-08-12
Payer: COMMERCIAL

## 2024-08-26 ENCOUNTER — APPOINTMENT (OUTPATIENT)
Dept: PRIMARY CARE | Facility: CLINIC | Age: 49
End: 2024-08-26
Payer: COMMERCIAL

## 2024-08-26 VITALS
HEART RATE: 96 BPM | OXYGEN SATURATION: 97 % | DIASTOLIC BLOOD PRESSURE: 80 MMHG | SYSTOLIC BLOOD PRESSURE: 134 MMHG | WEIGHT: 115 LBS | HEIGHT: 65 IN | BODY MASS INDEX: 19.16 KG/M2

## 2024-08-26 DIAGNOSIS — E03.9 ACQUIRED HYPOTHYROIDISM: ICD-10-CM

## 2024-08-26 DIAGNOSIS — M54.2 NECK PAIN: ICD-10-CM

## 2024-08-26 DIAGNOSIS — M25.511 CHRONIC RIGHT SHOULDER PAIN: ICD-10-CM

## 2024-08-26 DIAGNOSIS — J20.9 ACUTE BRONCHITIS, UNSPECIFIED ORGANISM: ICD-10-CM

## 2024-08-26 DIAGNOSIS — G89.29 CHRONIC RIGHT SHOULDER PAIN: ICD-10-CM

## 2024-08-26 DIAGNOSIS — B35.9 TINEA: Primary | ICD-10-CM

## 2024-08-26 DIAGNOSIS — J44.9 COPD, MODERATE (MULTI): ICD-10-CM

## 2024-08-26 DIAGNOSIS — F41.9 ANXIETY: ICD-10-CM

## 2024-08-26 DIAGNOSIS — K21.9 GASTROESOPHAGEAL REFLUX DISEASE WITHOUT ESOPHAGITIS: ICD-10-CM

## 2024-08-26 DIAGNOSIS — F17.200 SMOKER: ICD-10-CM

## 2024-08-26 PROCEDURE — 99214 OFFICE O/P EST MOD 30 MIN: CPT

## 2024-08-26 PROCEDURE — 3008F BODY MASS INDEX DOCD: CPT

## 2024-08-26 RX ORDER — DICLOFENAC SODIUM 10 MG/G
4 GEL TOPICAL 4 TIMES DAILY
Qty: 100 G | Refills: 2 | Status: SHIPPED | OUTPATIENT
Start: 2024-08-26

## 2024-08-26 RX ORDER — KETOCONAZOLE 20 MG/ML
SHAMPOO, SUSPENSION TOPICAL DAILY
Qty: 120 ML | Refills: 0 | Status: SHIPPED | OUTPATIENT
Start: 2024-08-26 | End: 2024-08-29

## 2024-08-26 RX ORDER — CYCLOBENZAPRINE HCL 10 MG
10 TABLET ORAL NIGHTLY PRN
Qty: 30 TABLET | Refills: 0 | Status: CANCELLED | OUTPATIENT
Start: 2024-08-26 | End: 2024-10-25

## 2024-08-26 RX ORDER — LEVOTHYROXINE SODIUM 25 UG/1
25 TABLET ORAL
Qty: 90 TABLET | Refills: 3 | Status: SHIPPED | OUTPATIENT
Start: 2024-08-26 | End: 2025-08-26

## 2024-08-26 RX ORDER — ALBUTEROL SULFATE 90 UG/1
2 INHALANT RESPIRATORY (INHALATION) EVERY 6 HOURS PRN
Qty: 18 G | Refills: 2 | Status: SHIPPED | OUTPATIENT
Start: 2024-08-26 | End: 2024-09-25

## 2024-08-26 ASSESSMENT — ENCOUNTER SYMPTOMS
GASTROINTESTINAL NEGATIVE: 1
CARDIOVASCULAR NEGATIVE: 1
CONSTITUTIONAL NEGATIVE: 1
RESPIRATORY NEGATIVE: 1

## 2024-08-26 NOTE — PROGRESS NOTES
"Subjective   Patient ID: Mari Sharma is a 49 y.o. female who presents for pt here for med check  (Pt c/o skin condition is spreading to right arm ).    HPI   GERD: Issues since gallbladder was removed, has struggled with abdominal pain and vomiting after eating for years now, following with Dr. Jack.  HYPOTHYROID: Compliant with levo, no SE. Last TSH WNL.  ANXIETY: Compliant with Wellbutrin, stable. KIMBER 0 today.  COPD: Stable on Anoro, needing rescue about 5-10 times yearly.  SMOKER: 38 pack years.   SHOULDER/NECK PAIN: R shoulder/neck pain chronically. Worse in colder weather. Work is physical. Pain is intermittent, cannot correlate with movement, seems to be posture related. R arm has gone \"numb\" while lying down as well. Has been going to chrio/massage therapy and this is helping.    Endorses chronic tinea infection to skin, ketoconazole shampoo effective in the past, no SE, currently rash to trunk and spreading to R arm.    [facility] in [place]. They take care of mammos, good 2023.  Colonoscopy 2022, due again 2027.    Review of Systems   Constitutional: Negative.    Respiratory: Negative.     Cardiovascular: Negative.    Gastrointestinal: Negative.        Objective   /80   Pulse 96   Ht 1.651 m (5' 5\")   Wt 52.2 kg (115 lb)   SpO2 97%   BMI 19.14 kg/m²     Physical Exam  Constitutional:       General: She is not in acute distress.     Appearance: Normal appearance. She is not ill-appearing.   HENT:      Head: Normocephalic and atraumatic.   Eyes:      Extraocular Movements: Extraocular movements intact.      Conjunctiva/sclera: Conjunctivae normal.   Cardiovascular:      Rate and Rhythm: Normal rate.   Pulmonary:      Effort: Pulmonary effort is normal.   Abdominal:      General: There is no distension.   Musculoskeletal:         General: Normal range of motion.      Cervical back: Normal range of motion.   Skin:     General: Skin is warm and dry.   Neurological:      General: No focal " deficit present.      Mental Status: She is alert and oriented to person, place, and time.   Psychiatric:         Mood and Affect: Mood normal.         Behavior: Behavior normal.         Thought Content: Thought content normal.         Judgment: Judgment normal.         Assessment/Plan        Rx ketoconazole shampoo.  No other medication changes today.  Chronic conditions stable.  Labs reviewed and stable.  Follow up 6 months or sooner prn.

## 2024-09-24 DIAGNOSIS — K21.9 GASTROESOPHAGEAL REFLUX DISEASE WITHOUT ESOPHAGITIS: ICD-10-CM

## 2024-09-24 RX ORDER — PANTOPRAZOLE SODIUM 40 MG/1
40 TABLET, DELAYED RELEASE ORAL DAILY
Qty: 90 TABLET | Refills: 3 | Status: SHIPPED | OUTPATIENT
Start: 2024-09-24

## 2024-10-15 ENCOUNTER — APPOINTMENT (OUTPATIENT)
Dept: PRIMARY CARE | Facility: CLINIC | Age: 49
End: 2024-10-15
Payer: COMMERCIAL

## 2024-10-15 VITALS
SYSTOLIC BLOOD PRESSURE: 140 MMHG | WEIGHT: 114.2 LBS | HEART RATE: 88 BPM | BODY MASS INDEX: 19 KG/M2 | DIASTOLIC BLOOD PRESSURE: 80 MMHG

## 2024-10-15 DIAGNOSIS — R19.09 ABDOMINAL MASS OF OTHER SITE: Primary | ICD-10-CM

## 2024-10-15 DIAGNOSIS — R10.9 ABDOMINAL PAIN, UNSPECIFIED ABDOMINAL LOCATION: ICD-10-CM

## 2024-10-15 PROCEDURE — 99214 OFFICE O/P EST MOD 30 MIN: CPT | Performed by: NURSE PRACTITIONER

## 2024-10-15 RX ORDER — PROMETHAZINE HYDROCHLORIDE 25 MG/1
SUPPOSITORY RECTAL
COMMUNITY
Start: 2023-10-16

## 2024-10-15 ASSESSMENT — ENCOUNTER SYMPTOMS
CARDIOVASCULAR NEGATIVE: 1
CONSTITUTIONAL NEGATIVE: 1
PSYCHIATRIC NEGATIVE: 1
ABDOMINAL PAIN: 1
RESPIRATORY NEGATIVE: 1

## 2024-10-15 NOTE — PROGRESS NOTES
Subjective   Patient ID: Mari Sharma is a 49 y.o. female who presents for Possible hernia.    Has had trouble with stomach/abdominal pain for years, particularly the stomach upper left abdomen.  Has had many CT's/Xrays over the years, all normal  Had a gynecological exam recently without problems  Has seen Dr. Jack and had EGD and colonoscopy which were OK. Sees him once  yearly now.  Had cholecystectomy many years ago.  Recently pain more lower abdomen. Has to go to ER when has flares of this.  Reviewed ER report from [facility] 9/27/24. No remarkable findings. Usually needs to go to the ER when symptoms flare. Said Dr. Wolff thought maybe she had a hernia.         Review of Systems   Constitutional: Negative.    HENT: Negative.     Respiratory: Negative.     Cardiovascular: Negative.    Gastrointestinal:  Positive for abdominal pain.   Skin: Negative.    Psychiatric/Behavioral: Negative.         Objective   /80 (BP Location: Left arm)   Pulse 88   Wt 51.8 kg (114 lb 3.2 oz)   BMI 19.00 kg/m²     Physical Exam  Constitutional:       Appearance: Normal appearance.   HENT:      Head: Normocephalic.   Eyes:      Conjunctiva/sclera: Conjunctivae normal.   Cardiovascular:      Rate and Rhythm: Normal rate and regular rhythm.      Heart sounds: Normal heart sounds.   Pulmonary:      Effort: Pulmonary effort is normal.      Breath sounds: Normal breath sounds.   Abdominal:      General: Bowel sounds are normal.      Palpations: Abdomen is soft. There is mass (approximately 1-2 inches lower mid abdomen).   Musculoskeletal:      Cervical back: Neck supple.   Skin:     General: Skin is warm and dry.   Neurological:      Mental Status: She is alert.   Psychiatric:         Mood and Affect: Mood normal.         Assessment/Plan   Mari was seen today for possible hernia.  Diagnoses and all orders for this visit:  Abdominal mass of other site (Primary)  -     Referral to General Surgery; Future  Abdominal pain,  unspecified abdominal location     Episodes of abdominal pain for years. No one has really been able to figure it out. Currently sees gastroenterologist once yearly.  Recently pain more in lower abdomen. Lump lower mid abdomen on exam.  Referral to Dr. Plunkett.  Follow up as scheduled/as needed.

## 2024-10-18 ENCOUNTER — OFFICE VISIT (OUTPATIENT)
Dept: SURGERY | Facility: CLINIC | Age: 49
End: 2024-10-18
Payer: COMMERCIAL

## 2024-10-18 ENCOUNTER — HOSPITAL ENCOUNTER (OUTPATIENT)
Dept: RADIOLOGY | Facility: HOSPITAL | Age: 49
Discharge: HOME | End: 2024-10-18
Payer: COMMERCIAL

## 2024-10-18 VITALS
HEART RATE: 92 BPM | SYSTOLIC BLOOD PRESSURE: 122 MMHG | DIASTOLIC BLOOD PRESSURE: 76 MMHG | BODY MASS INDEX: 18.83 KG/M2 | WEIGHT: 113 LBS | HEIGHT: 65 IN

## 2024-10-18 DIAGNOSIS — R19.09 ABDOMINAL MASS OF OTHER SITE: ICD-10-CM

## 2024-10-18 DIAGNOSIS — R19.09 ABDOMINAL MASS OF OTHER SITE: Primary | ICD-10-CM

## 2024-10-18 PROBLEM — R19.00 ABDOMINAL LUMP: Status: ACTIVE | Noted: 2024-10-18

## 2024-10-18 PROCEDURE — 99203 OFFICE O/P NEW LOW 30 MIN: CPT | Performed by: SURGERY

## 2024-10-18 PROCEDURE — 3008F BODY MASS INDEX DOCD: CPT | Performed by: SURGERY

## 2024-10-18 PROCEDURE — 76705 ECHO EXAM OF ABDOMEN: CPT

## 2024-10-18 NOTE — LETTER
2024     MAREK [place]  [address]  [facility], [place] [id number]    Patient: Mari Sharma   YOB: 1975   Date of Visit: 10/18/2024       Dear MAREK Rehman:    Thank you for referring Mari Sharma to me for evaluation. Below are my notes for this consultation.  If you have questions, please do not hesitate to call me. I look forward to following your patient along with you.       Sincerely,     Ania Plunkett MD      CC: No Recipients  ______________________________________________________________________________________    General Surgery Consultation    Patient: Mari Sharma  : 1975  MRN: [id number]  Date of Consultation: 10/18/24    Primary Care Provider: Javed English PA-C  Referring Provider: Erica Sierra APRN-CNP    Chief Complaint: Lower abdominal wall mass    History of Present Illness: Mari Sharma is a 49 y.o. old female seen at the request of Erica Sierra APRN-CNP for evaluation of lower abdominal wall mass.  Patient states she has had vague abdominal complaints over the years and has undergone multiple workups.  Within the last few months she noticed a mass in her lower mid abdomen which occasionally will swell up and be tender.  She recently just had a CTA to rule out mesenteric ischemia.  Unfortunately we do not have access to the films as they were done at [place].  The report however, does comment that the abdominal wall is normal.  It also shows an enlarged uterus and she states she has been seeing a gynecologist.  Medical History:  Past Medical History:   Diagnosis Date   • Anxiety    • COPD (chronic obstructive pulmonary disease) (Multi)    • Depression    • GERD (gastroesophageal reflux disease)    • Hypothyroidism        Surgical History:  Past Surgical History:   Procedure Laterality Date   • BI MAMMO BILATERAL SCREENING  2023    CAT 2 BENIGN   • COLONOSCOPY  2022    REPEAT 5  YRS/ DR JACK   • DILATION AND CURETTAGE OF UTERUS     • ESOPHAGOGASTRODUODENOSCOPY  06/23/2023    DR JACK   • OTHER SURGICAL HISTORY  12/19/2019    Appendectomy   • OTHER SURGICAL HISTORY  12/19/2019    Cyst excision   • OTHER SURGICAL HISTORY  12/19/2019    Cholecystectomy   • OTHER SURGICAL HISTORY  01/03/2020    [place] tooth extraction       Home Medications:  Prior to Admission medications    Medication Sig Start Date End Date Taking? Authorizing Provider   Anoro Ellipta 62.5-25 mcg/actuation blister with device Inhale 1 puff once daily. 7/2/24 7/2/25 Yes Javed English PA-C   ascorbic acid (VITAMIN C ORAL) Take 1 tablet by mouth once daily.   Yes Historical Provider, MD   buPROPion SR (Wellbutrin SR) 150 mg 12 hr tablet Take 1 tablet (150 mg) by mouth once daily. 7/2/24 7/2/25 Yes Javed English PA-C   dextromethorphan-guaifenesin (Mucinex DM)  mg 12 hr tablet Take 1 tablet by mouth every 12 hours. Do not crush, chew, or split.   Yes Historical Provider, MD   diclofenac sodium (Voltaren) 1 % gel Apply 4.5 inches (4 g) topically 4 times a day. 8/26/24  Yes Javed English PA-C   dicyclomine (Bentyl) 20 mg tablet Take 1 tablet (20 mg) by mouth. 5/6/22  Yes Historical Provider, MD   estradiol-norethindrone acet (ActivElla) 1-0.5 mg tablet Take 1 tablet by mouth once daily. 12/4/20  Yes Historical Provider, MD   levothyroxine (Synthroid, Levoxyl) 25 mcg tablet Take 1 tablet (25 mcg) by mouth once daily. 8/26/24 8/26/25 Yes Javed English PA-C   MULTIVITAMIN ORAL Take 1 tablet by mouth once daily.   Yes Historical Provider, MD   pantoprazole (ProtoNix) 40 mg EC tablet TAKE 1 TABLET BY MOUTH ONCE  DAILY 9/24/24  Yes Trey Jack, DO   promethazine (Promethegan) 25 mg suppository Insert 1 suppository (25 mg) into the rectum every 6 hours if needed for nausea or vomiting. 10/16/23  Yes Historical Provider, MD   sucralfate (Carafate) 1 gram tablet Take 1 tablet (1 g) by mouth 2 times a day before meals.  "4/18/24 4/18/25 Yes Trey Jack, DO   ZINC ORAL Take 1 tablet by mouth once daily.   Yes Historical Provider, MD   albuterol 90 mcg/actuation inhaler Inhale 2 puffs every 6 hours if needed for wheezing. 8/26/24 9/25/24  Javed English PA-C   cyclobenzaprine (Flexeril) 10 mg tablet Take 1 tablet (10 mg) by mouth as needed at bedtime for muscle spasms. 2/12/24 4/12/24  Javed English PA-C       Allergies:  No Known Allergies  has No Known Allergies.    Family History:   Family History   Problem Relation Name Age of Onset   • Breast cancer Mother     • Stomach cancer Mother     • Lung cancer Father     • Brain cancer Father     • Alzheimer's disease Paternal Grandmother         Social History:  Social History     Socioeconomic History   • Marital status:    Tobacco Use   • Smoking status: Every Day     Current packs/day: 1.00     Types: Cigarettes   • Smokeless tobacco: Never   Vaping Use   • Vaping status: Never Used   Substance and Sexual Activity   • Alcohol use: Not Currently     Comment: beer   • Drug use: Never   • Sexual activity: Defer   Patient states she drinks 4 beers daily.  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[employer]  ROS:  Constitutional:  no fever, sweats, and chills  Cardiovascular: No chest pain  Respiratory: No cough or shortness of breath  Gastrointestinal: Last colonoscopy 2022  Genitourinary: no dysuria  Musculoskeletal: no weakness or swelling  Integumentary: no rashes  Neurological: no confusion  Endocrine: no heat or cold intolerance  Heme/Lymph: no easy bruising or bleeding    Objective:  /76   Pulse 92   Ht 1.651 m (5' 5\")   Wt 51.3 kg (113 lb)   BMI 18.80 kg/m²     Physical Exam:  Constitutional: No acute distress, conversant, pleasant  Neurologic: alert and oriented  Psych: appropriate affect  Ears, Nose, Mouth and Throat: mucus membranes moist  Pulmonary: No labored breathing  Cardiovascular: Regular rate and rhythm  Abdomen: soft, non-distended, non-tender in her " lower mid abdomen she has a 3 cm firm mass which is not reducible.  There are no incisions near here.  Musculoskeletal: Moves all extremities, no edema  Skin: warm and dry    Assessment and Plan: Mari Sharma is a 49 y.o. old female with a lower abdominal wall mass.  Recent CTA stated that the abdominal wall was intact.  The mass is present however.  Given the amount of radiation she is received over the past years I feel it might be prudent to start with an ultrasound.  It is an unclear process as it does not feel like a lipoma, nor hernia nor sebaceous cyst.  Will see what the ultrasound shows and she understands she could still require CT to better define this.  All questions were answered..     Ania Plunkett MD  10/18/2024

## 2024-10-18 NOTE — LETTER
2024     Javed English PA-C  [address]  [facility], [place] [address]    Patient: Mari Sharma   YOB: 1975   Date of Visit: 10/18/2024       Dear Dr. Javed English PA-C:    Thank you for referring Mari Sharma to me for evaluation. Below are my notes for this consultation.  If you have questions, please do not hesitate to call me. I look forward to following your patient along with you.       Sincerely,     Ania Plunkett MD      CC: MAREK [place]  ______________________________________________________________________________________    General Surgery Consultation    Patient: Mari Sharma  : 1975  MRN: [id number]  Date of Consultation: 10/18/24    Primary Care Provider: Javed English PA-C  Referring Provider: Erica Sierra APRN-CNP    Chief Complaint: Lower abdominal wall mass    History of Present Illness: Mari Sharma is a 49 y.o. old female seen at the request of Erica Sierra APRN-CNP for evaluation of lower abdominal wall mass.  Patient states she has had vague abdominal complaints over the years and has undergone multiple workups.  Within the last few months she noticed a mass in her lower mid abdomen which occasionally will swell up and be tender.  She recently just had a CTA to rule out mesenteric ischemia.  Unfortunately we do not have access to the films as they were done at [place].  The report however, does comment that the abdominal wall is normal.  It also shows an enlarged uterus and she states she has been seeing a gynecologist.  Medical History:  Past Medical History:   Diagnosis Date   • Anxiety    • COPD (chronic obstructive pulmonary disease) (Multi)    • Depression    • GERD (gastroesophageal reflux disease)    • Hypothyroidism        Surgical History:  Past Surgical History:   Procedure Laterality Date   • BI MAMMO BILATERAL SCREENING  2023    CAT 2 BENIGN   • COLONOSCOPY  2022    REPEAT 5  YRS/ DR JACK   • DILATION AND CURETTAGE OF UTERUS     • ESOPHAGOGASTRODUODENOSCOPY  06/23/2023    DR JACK   • OTHER SURGICAL HISTORY  12/19/2019    Appendectomy   • OTHER SURGICAL HISTORY  12/19/2019    Cyst excision   • OTHER SURGICAL HISTORY  12/19/2019    Cholecystectomy   • OTHER SURGICAL HISTORY  01/03/2020    [place] tooth extraction       Home Medications:  Prior to Admission medications    Medication Sig Start Date End Date Taking? Authorizing Provider   Anoro Ellipta 62.5-25 mcg/actuation blister with device Inhale 1 puff once daily. 7/2/24 7/2/25 Yes Javed English PA-C   ascorbic acid (VITAMIN C ORAL) Take 1 tablet by mouth once daily.   Yes Historical Provider, MD   buPROPion SR (Wellbutrin SR) 150 mg 12 hr tablet Take 1 tablet (150 mg) by mouth once daily. 7/2/24 7/2/25 Yes Javed English PA-C   dextromethorphan-guaifenesin (Mucinex DM)  mg 12 hr tablet Take 1 tablet by mouth every 12 hours. Do not crush, chew, or split.   Yes Historical Provider, MD   diclofenac sodium (Voltaren) 1 % gel Apply 4.5 inches (4 g) topically 4 times a day. 8/26/24  Yes Javed English PA-C   dicyclomine (Bentyl) 20 mg tablet Take 1 tablet (20 mg) by mouth. 5/6/22  Yes Historical Provider, MD   estradiol-norethindrone acet (ActivElla) 1-0.5 mg tablet Take 1 tablet by mouth once daily. 12/4/20  Yes Historical Provider, MD   levothyroxine (Synthroid, Levoxyl) 25 mcg tablet Take 1 tablet (25 mcg) by mouth once daily. 8/26/24 8/26/25 Yes Javed English PA-C   MULTIVITAMIN ORAL Take 1 tablet by mouth once daily.   Yes Historical Provider, MD   pantoprazole (ProtoNix) 40 mg EC tablet TAKE 1 TABLET BY MOUTH ONCE  DAILY 9/24/24  Yes Trey Jack, DO   promethazine (Promethegan) 25 mg suppository Insert 1 suppository (25 mg) into the rectum every 6 hours if needed for nausea or vomiting. 10/16/23  Yes Historical Provider, MD   sucralfate (Carafate) 1 gram tablet Take 1 tablet (1 g) by mouth 2 times a day before meals.  "4/18/24 4/18/25 Yes Trey Jack, DO   ZINC ORAL Take 1 tablet by mouth once daily.   Yes Historical Provider, MD   albuterol 90 mcg/actuation inhaler Inhale 2 puffs every 6 hours if needed for wheezing. 8/26/24 9/25/24  Javed English PA-C   cyclobenzaprine (Flexeril) 10 mg tablet Take 1 tablet (10 mg) by mouth as needed at bedtime for muscle spasms. 2/12/24 4/12/24  Javed English PA-C       Allergies:  No Known Allergies  has No Known Allergies.    Family History:   Family History   Problem Relation Name Age of Onset   • Breast cancer Mother     • Stomach cancer Mother     • Lung cancer Father     • Brain cancer Father     • Alzheimer's disease Paternal Grandmother         Social History:  Social History     Socioeconomic History   • Marital status:    Tobacco Use   • Smoking status: Every Day     Current packs/day: 1.00     Types: Cigarettes   • Smokeless tobacco: Never   Vaping Use   • Vaping status: Never Used   Substance and Sexual Activity   • Alcohol use: Not Currently     Comment: beer   • Drug use: Never   • Sexual activity: Defer   Patient states she drinks 4 beers daily.  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[employer]  ROS:  Constitutional:  no fever, sweats, and chills  Cardiovascular: No chest pain  Respiratory: No cough or shortness of breath  Gastrointestinal: Last colonoscopy 2022  Genitourinary: no dysuria  Musculoskeletal: no weakness or swelling  Integumentary: no rashes  Neurological: no confusion  Endocrine: no heat or cold intolerance  Heme/Lymph: no easy bruising or bleeding    Objective:  /76   Pulse 92   Ht 1.651 m (5' 5\")   Wt 51.3 kg (113 lb)   BMI 18.80 kg/m²     Physical Exam:  Constitutional: No acute distress, conversant, pleasant  Neurologic: alert and oriented  Psych: appropriate affect  Ears, Nose, Mouth and Throat: mucus membranes moist  Pulmonary: No labored breathing  Cardiovascular: Regular rate and rhythm  Abdomen: soft, non-distended, non-tender in her " lower mid abdomen she has a 3 cm firm mass which is not reducible.  There are no incisions near here.  Musculoskeletal: Moves all extremities, no edema  Skin: warm and dry    Assessment and Plan: Mari Sharma is a 49 y.o. old female with a lower abdominal wall mass.  Recent CTA stated that the abdominal wall was intact.  The mass is present however.  Given the amount of radiation she is received over the past years I feel it might be prudent to start with an ultrasound.  It is an unclear process as it does not feel like a lipoma, nor hernia nor sebaceous cyst.  Will see what the ultrasound shows and she understands she could still require CT to better define this.  All questions were answered..     Ania Plunkett MD  10/18/2024

## 2024-10-21 ENCOUNTER — APPOINTMENT (OUTPATIENT)
Dept: RADIOLOGY | Facility: HOSPITAL | Age: 49
End: 2024-10-21
Payer: COMMERCIAL

## 2024-10-24 ENCOUNTER — HOSPITAL ENCOUNTER (OUTPATIENT)
Dept: RADIOLOGY | Facility: HOSPITAL | Age: 49
Discharge: HOME | End: 2024-10-24
Payer: COMMERCIAL

## 2024-10-24 ENCOUNTER — APPOINTMENT (OUTPATIENT)
Dept: RADIOLOGY | Facility: HOSPITAL | Age: 49
End: 2024-10-24
Payer: COMMERCIAL

## 2024-10-24 DIAGNOSIS — R19.09 ABDOMINAL MASS OF OTHER SITE: ICD-10-CM

## 2024-10-24 PROCEDURE — A9698 NON-RAD CONTRAST MATERIALNOC: HCPCS | Performed by: SURGERY

## 2024-10-24 PROCEDURE — 2550000001 HC RX 255 CONTRASTS: Performed by: SURGERY

## 2024-10-24 PROCEDURE — 74177 CT ABD & PELVIS W/CONTRAST: CPT

## 2024-10-28 ENCOUNTER — APPOINTMENT (OUTPATIENT)
Dept: SURGERY | Facility: CLINIC | Age: 49
End: 2024-10-28
Payer: COMMERCIAL

## 2024-10-28 VITALS
DIASTOLIC BLOOD PRESSURE: 76 MMHG | SYSTOLIC BLOOD PRESSURE: 136 MMHG | HEART RATE: 80 BPM | BODY MASS INDEX: 18.83 KG/M2 | HEIGHT: 65 IN | WEIGHT: 113 LBS

## 2024-10-28 DIAGNOSIS — N85.8 UTERINE MASS: Primary | ICD-10-CM

## 2024-10-28 PROCEDURE — 3008F BODY MASS INDEX DOCD: CPT | Performed by: SURGERY

## 2024-10-28 PROCEDURE — 99213 OFFICE O/P EST LOW 20 MIN: CPT | Performed by: SURGERY

## 2024-12-09 DIAGNOSIS — K21.9 GASTROESOPHAGEAL REFLUX DISEASE WITHOUT ESOPHAGITIS: ICD-10-CM

## 2024-12-09 RX ORDER — PANTOPRAZOLE SODIUM 40 MG/1
40 TABLET, DELAYED RELEASE ORAL DAILY
Qty: 90 TABLET | Refills: 3 | Status: SHIPPED | OUTPATIENT
Start: 2024-12-09

## 2025-02-03 DIAGNOSIS — J44.9 COPD, MODERATE (MULTI): ICD-10-CM

## 2025-02-03 RX ORDER — UMECLIDINIUM BROMIDE AND VILANTEROL TRIFENATATE 62.5; 25 UG/1; UG/1
1 POWDER RESPIRATORY (INHALATION)
Refills: 11 | OUTPATIENT
Start: 2025-02-03 | End: 2026-02-03

## 2025-02-10 RX ORDER — UMECLIDINIUM BROMIDE AND VILANTEROL TRIFENATATE 62.5; 25 UG/1; UG/1
1 POWDER RESPIRATORY (INHALATION)
Qty: 30 EACH | Refills: 5 | Status: SHIPPED | OUTPATIENT
Start: 2025-02-10 | End: 2025-08-09

## 2025-02-27 ENCOUNTER — APPOINTMENT (OUTPATIENT)
Dept: PRIMARY CARE | Facility: CLINIC | Age: 50
End: 2025-02-27
Payer: COMMERCIAL

## 2025-02-27 VITALS
BODY MASS INDEX: 17 KG/M2 | SYSTOLIC BLOOD PRESSURE: 104 MMHG | OXYGEN SATURATION: 96 % | WEIGHT: 102 LBS | HEART RATE: 95 BPM | DIASTOLIC BLOOD PRESSURE: 68 MMHG | HEIGHT: 65 IN

## 2025-02-27 DIAGNOSIS — M25.511 CHRONIC RIGHT SHOULDER PAIN: ICD-10-CM

## 2025-02-27 DIAGNOSIS — K21.9 GASTROESOPHAGEAL REFLUX DISEASE WITHOUT ESOPHAGITIS: ICD-10-CM

## 2025-02-27 DIAGNOSIS — L30.9 ECZEMA, UNSPECIFIED TYPE: Primary | ICD-10-CM

## 2025-02-27 DIAGNOSIS — M54.2 NECK PAIN: ICD-10-CM

## 2025-02-27 DIAGNOSIS — Z13.220 SCREENING CHOLESTEROL LEVEL: ICD-10-CM

## 2025-02-27 DIAGNOSIS — J44.9 COPD, MODERATE (MULTI): ICD-10-CM

## 2025-02-27 DIAGNOSIS — G89.29 CHRONIC RIGHT SHOULDER PAIN: ICD-10-CM

## 2025-02-27 DIAGNOSIS — E03.9 ACQUIRED HYPOTHYROIDISM: ICD-10-CM

## 2025-02-27 PROBLEM — F10.20 ALCOHOL DEPENDENCE: Status: RESOLVED | Noted: 2023-08-09 | Resolved: 2025-02-27

## 2025-02-27 PROBLEM — J47.9 BRONCHIECTASIS: Status: RESOLVED | Noted: 2023-09-13 | Resolved: 2025-02-27

## 2025-02-27 PROCEDURE — 3008F BODY MASS INDEX DOCD: CPT

## 2025-02-27 PROCEDURE — 99214 OFFICE O/P EST MOD 30 MIN: CPT

## 2025-02-27 RX ORDER — DICLOFENAC SODIUM 10 MG/G
4 GEL TOPICAL 4 TIMES DAILY
Qty: 100 G | Refills: 3 | Status: SHIPPED | OUTPATIENT
Start: 2025-02-27

## 2025-02-27 RX ORDER — TRIAMCINOLONE ACETONIDE 1 MG/G
CREAM TOPICAL 2 TIMES DAILY
Qty: 15 G | Refills: 0 | Status: SHIPPED | OUTPATIENT
Start: 2025-02-27

## 2025-02-27 ASSESSMENT — PATIENT HEALTH QUESTIONNAIRE - PHQ9
1. LITTLE INTEREST OR PLEASURE IN DOING THINGS: NOT AT ALL
SUM OF ALL RESPONSES TO PHQ9 QUESTIONS 1 AND 2: 0
2. FEELING DOWN, DEPRESSED OR HOPELESS: NOT AT ALL

## 2025-02-27 ASSESSMENT — ENCOUNTER SYMPTOMS
ABDOMINAL PAIN: 1
CONSTITUTIONAL NEGATIVE: 1
CARDIOVASCULAR NEGATIVE: 1
RESPIRATORY NEGATIVE: 1

## 2025-02-27 NOTE — PROGRESS NOTES
"Subjective   Patient ID: Mari Sharma is a 50 y.o. female who presents for pt here for med check  (Pt c/o right side neck skin discoloration, night sweats and lump on sternum ).    HPI   GERD: Issues since gallbladder was removed, has struggled with abdominal pain and vomiting after eating for years now, will see GI soon.  HYPOTHYROID: Compliant with levo, no SE. Last TSH WNL.  ANXIETY: Compliant with Wellbutrin, stable. KIMBER 0 today.  COPD: Stable on Anoro, needing rescue about 5-10 times yearly.  SMOKER: 39 pack years.   SHOULDER/NECK PAIN: R shoulder/neck pain chronically. Worse in colder weather. Work is physical. Pain is intermittent, cannot correlate with movement, seems to be posture related. R arm has gone \"numb\" while lying down as well. Has been going to chiro/massage therapy and this is helping.  TINEA: Intermittent, to arms/trunk, ketoconazole shampoo effective prn.    Endorses rash/dry skin to BL neck.     [facility] in [place]. They take care of mammos.  Colonoscopy 2022, due again 2027.    Review of Systems   Constitutional: Negative.    Respiratory: Negative.     Cardiovascular: Negative.    Gastrointestinal:  Positive for abdominal pain.       Objective   /68   Pulse 95   Ht 1.651 m (5' 5\")   Wt 46.3 kg (102 lb)   SpO2 96%   BMI 16.97 kg/m²     Physical Exam  Constitutional:       General: She is not in acute distress.     Appearance: Normal appearance. She is not ill-appearing.   HENT:      Head: Normocephalic and atraumatic.   Eyes:      Extraocular Movements: Extraocular movements intact.      Conjunctiva/sclera: Conjunctivae normal.   Cardiovascular:      Rate and Rhythm: Normal rate.   Pulmonary:      Effort: Pulmonary effort is normal.   Abdominal:      General: There is no distension.   Musculoskeletal:         General: Normal range of motion.      Cervical back: Normal range of motion.   Skin:     General: Skin is warm and dry.      Findings: Rash present.   Neurological:     "  General: No focal deficit present.      Mental Status: She is alert and oriented to person, place, and time.   Psychiatric:         Mood and Affect: Mood normal.         Behavior: Behavior normal.         Thought Content: Thought content normal.         Judgment: Judgment normal.         Assessment/Plan        Rx Kenalog topical prn eczema.  No other medication changes today.  Follow up 6 months with fasting labs prior.

## 2025-04-07 ENCOUNTER — TELEPHONE (OUTPATIENT)
Dept: PRIMARY CARE | Facility: CLINIC | Age: 50
End: 2025-04-07
Payer: COMMERCIAL

## 2025-04-07 NOTE — TELEPHONE ENCOUNTER
[facility] would like to verify that the anoror ellipta dispense amount is 30 each? Wondering if it should be 1 with 5 refills     [phone]

## 2025-04-17 ENCOUNTER — APPOINTMENT (OUTPATIENT)
Dept: GASTROENTEROLOGY | Facility: CLINIC | Age: 50
End: 2025-04-17
Payer: COMMERCIAL

## 2025-04-17 VITALS — DIASTOLIC BLOOD PRESSURE: 76 MMHG | SYSTOLIC BLOOD PRESSURE: 124 MMHG

## 2025-04-17 DIAGNOSIS — K55.1 INTESTINAL ANGINA (MULTI): ICD-10-CM

## 2025-04-17 DIAGNOSIS — G43.D0 ABDOMINAL MIGRAINE, NOT INTRACTABLE: Primary | ICD-10-CM

## 2025-04-17 DIAGNOSIS — K29.60 ALKALINE REFLUX GASTRITIS: ICD-10-CM

## 2025-04-17 DIAGNOSIS — K21.9 GASTROESOPHAGEAL REFLUX DISEASE WITHOUT ESOPHAGITIS: ICD-10-CM

## 2025-04-17 PROCEDURE — 99214 OFFICE O/P EST MOD 30 MIN: CPT | Performed by: INTERNAL MEDICINE

## 2025-04-17 RX ORDER — RIZATRIPTAN BENZOATE 5 MG/1
5 TABLET, ORALLY DISINTEGRATING ORAL ONCE AS NEEDED
Qty: 9 TABLET | Refills: 0 | Status: SHIPPED | OUTPATIENT
Start: 2025-04-17 | End: 2025-05-17

## 2025-04-17 ASSESSMENT — ENCOUNTER SYMPTOMS
ENDOCRINE NEGATIVE: 1
ABDOMINAL PAIN: 1
EYES NEGATIVE: 1
NAUSEA: 1
CARDIOVASCULAR NEGATIVE: 1
HEMATOLOGIC/LYMPHATIC NEGATIVE: 1
NEUROLOGICAL NEGATIVE: 1
RESPIRATORY NEGATIVE: 1
VOMITING: 1
CONSTITUTIONAL NEGATIVE: 1

## 2025-04-17 NOTE — PROGRESS NOTES
Subjective   Patient ID: Mari Sharma is a 50 y.o. female who presents for Follow-up (Patient here for a follow up on GERD, abdominal cramping, vomiting. Has been in the ED a few times for vomiting and stomach pain.).    COLLEEN Guerra is an unfortunate 50-year-old female who has had exhaustive workup for intractable abdominal pain.  Had endoscopy with myself 1 year ago showing reflux changes with gastritis biopsies were negative Warren's, H. pylori or malabsorption.  Has been doing well with PPI therapy with breakthrough heartburn.  Primary symptoms are nausea followed by vomiting then headache.  Underwent recent EGD with new surgeon over [facility] follow-up with him next week.  Claims that testing per her understanding was normal.  Biopsies are pending at this time.    Because of persistent abdominal pain she underwent further workup was found to have uterine fibroids and wondered hysterectomy.  Hysterectomy revealed no evidence of endometriosis.    She continues to smoke 1 to 2 pack cigarettes daily.  Plans to working on the 5 years as a   and then retire.    Continues with abdominal pain some intermittent sometimes after meals.  CT scan showed no evidence of vascular compromise but is not had vascular mesenteric ultrasound.  Surgeon who she last saw consider diagnosis of cyclic vomiting syndrome.  No treatment rendered.    Review of Systems   Constitutional: Negative.    HENT: Negative.     Eyes: Negative.    Respiratory: Negative.     Cardiovascular: Negative.    Gastrointestinal:  Positive for abdominal pain, nausea and vomiting.   Endocrine: Negative.    Genitourinary: Negative.    Neurological: Negative.    Hematological: Negative.        Objective   Physical Exam  Vitals and nursing note reviewed.   Constitutional:       Appearance: Normal appearance.   HENT:      Head: Normocephalic.      Mouth/Throat:      Mouth: Mucous membranes are moist.      Pharynx: Oropharynx is clear.   Eyes:       Conjunctiva/sclera: Conjunctivae normal.      Pupils: Pupils are equal, round, and reactive to light.   Cardiovascular:      Rate and Rhythm: Normal rate and regular rhythm.      Pulses: Normal pulses.      Heart sounds: Normal heart sounds.   Pulmonary:      Effort: Pulmonary effort is normal.      Breath sounds: Normal breath sounds.   Abdominal:      General: Abdomen is flat. Bowel sounds are normal.      Palpations: Abdomen is soft.   Musculoskeletal:         General: Normal range of motion.      Cervical back: Normal range of motion and neck supple.   Skin:     General: Skin is warm and dry.   Neurological:      General: No focal deficit present.      Mental Status: She is alert and oriented to person, place, and time.   Psychiatric:         Behavior: Behavior normal.         Assessment/Plan            Trey Jack DO 04/17/25 1:22 PM

## 2025-05-05 ENCOUNTER — APPOINTMENT (OUTPATIENT)
Dept: VASCULAR MEDICINE | Facility: HOSPITAL | Age: 50
End: 2025-05-05
Payer: COMMERCIAL

## 2025-05-06 ENCOUNTER — HOSPITAL ENCOUNTER (OUTPATIENT)
Dept: VASCULAR MEDICINE | Facility: HOSPITAL | Age: 50
Discharge: HOME | End: 2025-05-06
Payer: COMMERCIAL

## 2025-05-06 DIAGNOSIS — K55.1 INTESTINAL ANGINA (MULTI): ICD-10-CM

## 2025-05-06 PROCEDURE — 93975 VASCULAR STUDY: CPT | Performed by: SURGERY

## 2025-05-06 PROCEDURE — 93975 VASCULAR STUDY: CPT

## 2025-05-12 ENCOUNTER — TELEPHONE (OUTPATIENT)
Dept: GASTROENTEROLOGY | Facility: CLINIC | Age: 50
End: 2025-05-12
Payer: COMMERCIAL

## 2025-05-12 NOTE — TELEPHONE ENCOUNTER
PATIENT NOTIFIED AND VERBALIZED UNDERSTANDING     Scheduled 6/17/25    ----- Message from Trey Jack sent at 5/12/2025  2:45 PM EDT -----  Please let Mari know that her mesenteric vascular ultrasound is completely normal.  Given this her symptoms are likely cyclic vomiting syndrome.  Have her follow-up in the office  ----- Message -----  From: Edy, Syngo - Cardiology Results In  Sent: 5/10/2025   9:34 AM EDT  To: Trey Jack, DO

## 2025-06-17 ENCOUNTER — APPOINTMENT (OUTPATIENT)
Dept: GASTROENTEROLOGY | Facility: CLINIC | Age: 50
End: 2025-06-17
Payer: COMMERCIAL

## 2025-07-06 ENCOUNTER — TELEPHONE (OUTPATIENT)
Dept: PRIMARY CARE | Facility: CLINIC | Age: 50
End: 2025-07-06
Payer: COMMERCIAL

## 2025-07-06 DIAGNOSIS — F32.89 OTHER DEPRESSION: ICD-10-CM

## 2025-07-07 DIAGNOSIS — F32.89 OTHER DEPRESSION: ICD-10-CM

## 2025-07-07 RX ORDER — BUPROPION HYDROCHLORIDE 150 MG/1
150 TABLET, EXTENDED RELEASE ORAL
Qty: 30 TABLET | Refills: 11 | OUTPATIENT
Start: 2025-07-07 | End: 2026-07-07

## 2025-07-07 RX ORDER — BUPROPION HYDROCHLORIDE 150 MG/1
150 TABLET, EXTENDED RELEASE ORAL
Qty: 30 TABLET | Refills: 1 | Status: SHIPPED | OUTPATIENT
Start: 2025-07-07

## 2025-08-06 DIAGNOSIS — J44.9 COPD, MODERATE (MULTI): ICD-10-CM

## 2025-08-06 RX ORDER — UMECLIDINIUM BROMIDE AND VILANTEROL TRIFENATATE 62.5; 25 UG/1; UG/1
1 POWDER RESPIRATORY (INHALATION)
Refills: 5 | OUTPATIENT
Start: 2025-08-06 | End: 2026-02-02

## 2025-08-11 DIAGNOSIS — E03.9 ACQUIRED HYPOTHYROIDISM: ICD-10-CM

## 2025-08-11 DIAGNOSIS — Z13.220 SCREENING CHOLESTEROL LEVEL: ICD-10-CM

## 2025-08-12 RX ORDER — UMECLIDINIUM BROMIDE AND VILANTEROL TRIFENATATE 62.5; 25 UG/1; UG/1
1 POWDER RESPIRATORY (INHALATION)
Qty: 30 EACH | Refills: 5 | Status: SHIPPED | OUTPATIENT
Start: 2025-08-12 | End: 2026-02-08

## 2025-08-28 ENCOUNTER — APPOINTMENT (OUTPATIENT)
Dept: PRIMARY CARE | Facility: CLINIC | Age: 50
End: 2025-08-28
Payer: COMMERCIAL

## 2025-08-28 VITALS
DIASTOLIC BLOOD PRESSURE: 72 MMHG | SYSTOLIC BLOOD PRESSURE: 112 MMHG | HEIGHT: 65 IN | WEIGHT: 114 LBS | BODY MASS INDEX: 18.99 KG/M2 | HEART RATE: 80 BPM | OXYGEN SATURATION: 97 %

## 2025-08-28 DIAGNOSIS — G89.29 CHRONIC RIGHT SHOULDER PAIN: ICD-10-CM

## 2025-08-28 DIAGNOSIS — M54.2 NECK PAIN: Primary | ICD-10-CM

## 2025-08-28 DIAGNOSIS — F32.89 OTHER DEPRESSION: ICD-10-CM

## 2025-08-28 DIAGNOSIS — R11.0 NAUSEA: ICD-10-CM

## 2025-08-28 DIAGNOSIS — M25.511 CHRONIC RIGHT SHOULDER PAIN: ICD-10-CM

## 2025-08-28 DIAGNOSIS — E03.9 ACQUIRED HYPOTHYROIDISM: ICD-10-CM

## 2025-08-28 PROCEDURE — 3008F BODY MASS INDEX DOCD: CPT

## 2025-08-28 PROCEDURE — 99214 OFFICE O/P EST MOD 30 MIN: CPT

## 2025-08-28 RX ORDER — ONDANSETRON 4 MG/1
4 TABLET, ORALLY DISINTEGRATING ORAL EVERY 8 HOURS PRN
Qty: 21 TABLET | Refills: 3 | Status: SHIPPED | OUTPATIENT
Start: 2025-08-28 | End: 2025-09-25

## 2025-08-28 RX ORDER — CYCLOBENZAPRINE HCL 10 MG
10 TABLET ORAL NIGHTLY PRN
Qty: 30 TABLET | Refills: 0 | Status: SHIPPED | OUTPATIENT
Start: 2025-08-28 | End: 2025-10-27

## 2025-08-28 RX ORDER — LEVOTHYROXINE SODIUM 25 UG/1
25 TABLET ORAL
Qty: 90 TABLET | Refills: 3 | Status: SHIPPED | OUTPATIENT
Start: 2025-08-28 | End: 2026-08-28

## 2025-08-28 RX ORDER — BUPROPION HYDROCHLORIDE 150 MG/1
150 TABLET, EXTENDED RELEASE ORAL
Qty: 30 TABLET | Refills: 1 | Status: SHIPPED | OUTPATIENT
Start: 2025-08-28

## 2025-08-28 ASSESSMENT — ENCOUNTER SYMPTOMS
GASTROINTESTINAL NEGATIVE: 1
RESPIRATORY NEGATIVE: 1
CARDIOVASCULAR NEGATIVE: 1
CONSTITUTIONAL NEGATIVE: 1

## 2025-08-28 ASSESSMENT — PATIENT HEALTH QUESTIONNAIRE - PHQ9
SUM OF ALL RESPONSES TO PHQ9 QUESTIONS 1 AND 2: 0
2. FEELING DOWN, DEPRESSED OR HOPELESS: NOT AT ALL
1. LITTLE INTEREST OR PLEASURE IN DOING THINGS: NOT AT ALL

## 2025-09-04 ENCOUNTER — EVALUATION (OUTPATIENT)
Dept: PHYSICAL THERAPY | Facility: CLINIC | Age: 50
End: 2025-09-04
Payer: COMMERCIAL

## 2025-09-04 DIAGNOSIS — G89.29 CHRONIC RIGHT SHOULDER PAIN: ICD-10-CM

## 2025-09-04 DIAGNOSIS — R29.898 DECREASED ROM OF NECK: Primary | ICD-10-CM

## 2025-09-04 DIAGNOSIS — M54.2 NECK PAIN: ICD-10-CM

## 2025-09-04 DIAGNOSIS — M25.511 CHRONIC RIGHT SHOULDER PAIN: ICD-10-CM

## 2025-09-04 PROCEDURE — 97161 PT EVAL LOW COMPLEX 20 MIN: CPT | Mod: GP

## 2025-09-04 PROCEDURE — 97110 THERAPEUTIC EXERCISES: CPT | Mod: GP

## 2026-02-27 ENCOUNTER — APPOINTMENT (OUTPATIENT)
Dept: PRIMARY CARE | Facility: CLINIC | Age: 51
End: 2026-02-27
Payer: COMMERCIAL